# Patient Record
Sex: FEMALE | Race: WHITE | URBAN - NONMETROPOLITAN AREA
[De-identification: names, ages, dates, MRNs, and addresses within clinical notes are randomized per-mention and may not be internally consistent; named-entity substitution may affect disease eponyms.]

---

## 2023-05-23 ENCOUNTER — WEB ENCOUNTER (OUTPATIENT)
Dept: URBAN - NONMETROPOLITAN AREA CLINIC 13 | Facility: CLINIC | Age: 73
End: 2023-05-23

## 2023-05-23 ENCOUNTER — OFFICE VISIT (OUTPATIENT)
Dept: URBAN - NONMETROPOLITAN AREA CLINIC 2 | Facility: CLINIC | Age: 73
End: 2023-05-23

## 2023-05-23 ENCOUNTER — OFFICE VISIT (OUTPATIENT)
Dept: URBAN - NONMETROPOLITAN AREA CLINIC 13 | Facility: CLINIC | Age: 73
End: 2023-05-23
Payer: COMMERCIAL

## 2023-05-23 ENCOUNTER — LAB OUTSIDE AN ENCOUNTER (OUTPATIENT)
Dept: URBAN - NONMETROPOLITAN AREA CLINIC 13 | Facility: CLINIC | Age: 73
End: 2023-05-23

## 2023-05-23 VITALS
SYSTOLIC BLOOD PRESSURE: 146 MMHG | DIASTOLIC BLOOD PRESSURE: 86 MMHG | WEIGHT: 199 LBS | TEMPERATURE: 97.5 F | BODY MASS INDEX: 36.62 KG/M2 | HEIGHT: 62 IN | HEART RATE: 199 BPM

## 2023-05-23 DIAGNOSIS — K21.9 GASTROESOPHAGEAL REFLUX DISEASE, UNSPECIFIED WHETHER ESOPHAGITIS PRESENT: ICD-10-CM

## 2023-05-23 DIAGNOSIS — Z12.11 COLON CANCER SCREENING: ICD-10-CM

## 2023-05-23 PROBLEM — 235595009: Status: ACTIVE | Noted: 2023-05-23

## 2023-05-23 PROBLEM — 305058001: Status: ACTIVE | Noted: 2023-05-23

## 2023-05-23 PROCEDURE — 99204 OFFICE O/P NEW MOD 45 MIN: CPT | Performed by: INTERNAL MEDICINE

## 2023-05-23 RX ORDER — SEMAGLUTIDE 0.68 MG/ML
INJECTION, SOLUTION SUBCUTANEOUS
Qty: 3 MILLILITER | Status: ACTIVE | COMMUNITY

## 2023-05-23 RX ORDER — ATORVASTATIN CALCIUM, FILM COATED 80 MG/1
TAKE 1 TABLET BY MOUTH EVERY DAY TABLET ORAL
Qty: 90 EACH | Refills: 3 | Status: ACTIVE | COMMUNITY

## 2023-05-23 RX ORDER — VENLAFAXINE HYDROCHLORIDE 75 MG/1
TAKE 1 CAPSULE BY MOUTH EVERY MORNING AND 2 CAPSULES EVERY EVENING CAPSULE, EXTENDED RELEASE ORAL
Qty: 270 EACH | Refills: 0 | Status: ACTIVE | COMMUNITY

## 2023-05-23 RX ORDER — LISINOPRIL 10 MG/1
TAKE 1 TABLET BY MOUTH EVERY DAY TABLET ORAL
Qty: 90 EACH | Refills: 3 | Status: ACTIVE | COMMUNITY

## 2023-05-23 RX ORDER — OMEPRAZOLE 40 MG/1
1 CAPSULE 30 MINUTES BEFORE MORNING MEAL CAPSULE, DELAYED RELEASE ORAL ONCE A DAY
Qty: 30 | Refills: 5 | OUTPATIENT
Start: 2023-05-23

## 2023-05-23 RX ORDER — BUPROPION HYDROCHLORIDE 200 MG/1
TAKE 1 TABLET (200 MG TOTAL) BY MOUTH IN THE MORNING AND BEFORE BEDTIME TABLET, EXTENDED RELEASE ORAL
Qty: 180 EACH | Refills: 0 | Status: ACTIVE | COMMUNITY

## 2023-05-23 RX ORDER — ELECTROLYTES/DEXTROSE
2 TABLETS SOLUTION, ORAL ORAL ONCE A DAY
Qty: 60 | Status: ACTIVE | COMMUNITY
Start: 2023-05-23 | End: 2023-06-22

## 2023-05-23 NOTE — HPI-TODAY'S VISIT:
Ms. Yolanda Stephens is a 72-year-old female who presents today for reflux.  She reports experiencing reflux symptoms 2-3 times a week.  She is currently taking famotidine twice a day and is trying to alter her diet to avoid tomatoes and onions, though this is difficult because her favorite food is spaghetti.  She notes that the reflux symptoms began after increasing her Ozempic dose from 2.5-5.  She is not experiencing any bloating or regurgitation.  She also denies any abdominal pain.  She does not drink alcohol or use tobacco.  She uses Tylenol only and avoids NSAIDs.  Ms. Motley is also due for colonoscopy.  Last colonoscopy was approximately 10 years ago without any polyps.  She denies any family history of colorectal cancer.  LG.

## 2023-05-26 ENCOUNTER — TELEPHONE ENCOUNTER (OUTPATIENT)
Dept: URBAN - NONMETROPOLITAN AREA CLINIC 2 | Facility: CLINIC | Age: 73
End: 2023-05-26

## 2023-07-11 ENCOUNTER — OFFICE VISIT (OUTPATIENT)
Dept: URBAN - NONMETROPOLITAN AREA SURGERY CENTER 1 | Facility: SURGERY CENTER | Age: 73
End: 2023-07-11

## 2023-08-22 ENCOUNTER — OFFICE VISIT (OUTPATIENT)
Dept: URBAN - NONMETROPOLITAN AREA CLINIC 13 | Facility: CLINIC | Age: 73
End: 2023-08-22

## 2024-04-17 ENCOUNTER — DAC (OUTPATIENT)
Dept: URBAN - NONMETROPOLITAN AREA SURGERY CENTER 1 | Facility: SURGERY CENTER | Age: 74
End: 2024-04-17

## 2024-04-22 ENCOUNTER — DAC (OUTPATIENT)
Dept: URBAN - NONMETROPOLITAN AREA SURGERY CENTER 1 | Facility: SURGERY CENTER | Age: 74
End: 2024-04-22

## 2024-05-06 ENCOUNTER — OFFICE VISIT (OUTPATIENT)
Dept: URBAN - NONMETROPOLITAN AREA CLINIC 2 | Facility: CLINIC | Age: 74
End: 2024-05-06